# Patient Record
Sex: FEMALE | Race: AMERICAN INDIAN OR ALASKA NATIVE
[De-identification: names, ages, dates, MRNs, and addresses within clinical notes are randomized per-mention and may not be internally consistent; named-entity substitution may affect disease eponyms.]

---

## 2017-12-09 ENCOUNTER — HOSPITAL ENCOUNTER (EMERGENCY)
Dept: HOSPITAL 5 - ED | Age: 27
Discharge: HOME | End: 2017-12-09
Payer: MEDICARE

## 2017-12-09 VITALS — DIASTOLIC BLOOD PRESSURE: 61 MMHG | SYSTOLIC BLOOD PRESSURE: 99 MMHG
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
## 2017-12-09 DIAGNOSIS — S90.111A: Primary | ICD-10-CM

## 2017-12-09 PROCEDURE — 99284 EMERGENCY DEPT VISIT MOD MDM: CPT

## 2017-12-09 NOTE — XRAY REPORT
XRAY RIGHT FOOT THREE VIEWS: 12/09/17 10:00:00





CLINICAL: Trauma with pain and bruise to the right big toe.



FINDINGS: Normal bones and joints.  No fracture or dislocation.  Mild 

soft tissue swelling of the forefoot and at the great toe.  No soft 

tissue air or foreign body.



IMPRESSION: Soft tissue injury.

## 2017-12-09 NOTE — EMERGENCY DEPARTMENT REPORT
ED Lower Extremity HPI





- General


Chief Complaint: Extremity Injury, Lower


Stated Complaint: TOE PAIN


Time Seen by Provider: 12/09/17 11:22


Source: patient


Mode of arrival: Ambulatory


Limitations: No Limitations





- History of Present Illness


Initial Comments: 





Patient reports that she had accident last night where she dropped a plate on 

her right big toe.  She says she is having pain and bruising.  Reports pain is 

8 out of 10 worse with movement better with rest.  Patient she states that she 

tried to go to work today where she is a  and she was unable to perform 

duties because her toe was hurting from standing and walking.  pain is 

throbbing and aching and she did not take any medication for pain.


MD Complaint: foot injury (right big toe pain from injury)


-: Last night


Injury: Toes: Right (great toe pain from injury)


Type of Injury: blunt


Place: home


Severity: severe


Severity scale (0 -10): 8


Improves With: nothing


Worsens With: weight bearing, movement, palpation


Context: direct blow


Associated Symptoms: swelling, ambulatory.  denies: snap/pop sensation, numbness

, tingling, unable to bear weight


Treatments Prior to Arrival: other (none)





- Related Data


 Previous Rx's











 Medication  Instructions  Recorded  Last Taken  Type


 


Acetaminophen/Codeine [Tylenol 1 tab PO Q6H PRN 3 Days #12 tab 12/09/17 Unknown 

Rx





/Codeine # 3 tab]    


 


Ibuprofen [Motrin] 600 mg PO Q8H PRN 5 Days #15 tablet 12/09/17 Unknown Rx











 Allergies











Allergy/AdvReac Type Severity Reaction Status Date / Time


 


No Known Allergies Allergy   Verified 12/13/13 23:58














ED Review of Systems


ROS: 


Stated complaint: TOE PAIN


Other details as noted in HPI





Comment: All other systems reviewed and negative


Constitutional: no symptoms reported


Respiratory: no symptoms reported


Cardiovascular: denies: chest pain, palpitations, dyspnea on exertion, edema, 

syncope, paroxysmal nocturnal dyspnea


Gastrointestinal: denies: abdominal pain, nausea, vomiting


Musculoskeletal: joint swelling, arthralgia.  denies: back pain, myalgia


Skin: change in color (present to right big toe).  denies: rash


Neurological: denies: headache, numbness, paresthesias, confusion, abnormal gait

, vertigo





ED Past Medical Hx





- Past Medical History


Previous Medical History?: No


Additional medical history: abd ulcers





- Surgical History


Past Surgical History?: No





- Family History


Family history: no significant





- Social History


Smoking Status: Never Smoker


Substance Use Type: None





- Medications


Home Medications: 


 Home Medications











 Medication  Instructions  Recorded  Confirmed  Last Taken  Type


 


Acetaminophen/Codeine [Tylenol 1 tab PO Q6H PRN 3 Days #12 tab 12/09/17  

Unknown Rx





/Codeine # 3 tab]     


 


Ibuprofen [Motrin] 600 mg PO Q8H PRN 5 Days #15 tablet 12/09/17  Unknown Rx














ED Physical Exam





- General


Limitations: No Limitations


General appearance: alert, in no apparent distress





- Head


Head exam: Present: atraumatic, normocephalic, normal inspection





- Eye


Eye exam: Present: normal appearance, PERRL, EOMI


Pupils: Present: normal accommodation





- Neck


Neck exam: Present: normal inspection, full ROM.  Absent: tenderness, 

meningismus, lymphadenopathy





- Respiratory


Respiratory exam: Present: normal lung sounds bilaterally.  Absent: respiratory 

distress, chest wall tenderness, accessory muscle use





- Cardiovascular


Cardiovascular Exam: Present: regular rate, normal rhythm, normal heart sounds.

  Absent: systolic murmur, diastolic murmur





- GI/Abdominal


GI/Abdominal exam: Present: soft, normal bowel sounds.  Absent: distended, 

tenderness, guarding, rebound, rigid





- Extremities Exam


Extremities exam: Present: normal inspection, full ROM, tenderness (palpate 

right big toe), joint swelling ( rt big toe), other (no clubbing or cyanosis or 

extremities.  +2 pulses to all extremities.  No swelling to extremities except 

she has swelling to her right great toe with ecchymotic areas and swelling, 

tenderness to palpate right big toe.  She is able to move her right big toe but 

she said it's very painful.).  Absent: calf tenderness





- Expanded Lower Extremity Exam


  ** Right


Hip exam: Present: normal inspection, full ROM, pelvic stability.  Absent: 

tenderness, swelling, abrasion, laceration, ecchymosis, deformity, crepidus, 

dislocation, erythema, external rotation, internal rotation, shortening


Upper Leg exam: Present: normal inspection, full ROM.  Absent: tenderness, 

swelling, abrasion, laceration, ecchymosis, deformity, crepidus, dislocation, 

erythema


Knee exam: Present: normal inspection, full ROM, full knee extension.  Absent: 

tenderness, swelling, abrasion, laceration, ecchymosis, deformity, crepidus, 

dislocation, erythema, effusion, pain w/ pronation/supination, posterior draw 

sign, pain/laxity with valgus, pain/laxity with varus


Lower Leg exam: Present: normal inspection, full ROM.  Absent: tenderness, 

swelling, abrasion, laceration, ecchymosis, deformity, crepidus, dislocation, 

erythema, palpable cord, Mabel's sign


Ankle exam: Present: normal inspection, full ROM.  Absent: tenderness, swelling

, abrasion, laceration, ecchymosis, deformity, crepidus, dislocation, erythema


Foot/Toe exam: Present: full ROM (with full range of motion to extremities.  

Limited range of motion to the right big toe from swelling and injury.  Patient 

with significant pain with range of motion to the right big toe), tenderness (

big toe), swelling, ecchymosis, dislocation.  Absent: normal inspection, 

abrasion, laceration, deformity, crepidus, erythema, amputation, puncture wound

, foreign body, calcaneal tenderness, tenderness at base of 5th metatarsal, 

nail avulsion, subungual hematoma


Neuro vascular tendon exam: Present: no vascular compromise.  Absent: pulse 

deficit, abnormal cap refill, motor deficit, sensory deficit, tendon deficit, 

extremity cold to touch, pallor, abnormal 2-point discrimination, decreased fine

/light touch, foot drop, peroneal nerve deficit, significant pain with passive 

ROM of distal joint


Gait: Positive: observed and limited by pain





- Back Exam


Back exam: Present: normal inspection, full ROM.  Absent: tenderness





- Neurological Exam


Neurological exam: Present: alert, oriented X3, normal gait, reflexes normal.  

Absent: motor sensory deficit





- Psychiatric


Psychiatric exam: Present: normal affect, normal mood





- Skin


Skin exam: Present: warm, dry, intact, ecchymosis (the toe  with tenderness to 

palpate)





ED Course


 Vital Signs











  12/09/17 12/09/17





  10:12 11:34


 


Temperature 98.6 F 


 


Pulse Rate 65 


 


Respiratory 18 20





Rate  


 


Blood Pressure 101/67 


 


O2 Sat by Pulse 99 





Oximetry  














- Reevaluation(s)


Reevaluation #1: 





12/09/17 13:56


Given Motrin 800 mg by mouth for pain to right big toe.  Pain has been 

relieved.  please referred to procedure Notes for splinted in detail 





- Orthopedic Splinting/Casting


  ** Injury #1


Side: right


Lower Extremity Injury Location: toe (right great toe)


Lower Extremity Immobilizer: post-op shoe


Additional Comments: 





Pt with good neurovascular check





ED Lower Extremity MDM





- Radiology Data


Radiology results: report reviewed





X-ray of right foot reveal no fracture or dislocation to right great toe.  

Patient with mild soft tissue swelling.





- Medical Decision Making





ED course:Pt present to emergency room reports that she dropped heavy object on 

her right great toe last night.  She is reporting pain swelling and bruising.  

She is given Motrin 800 mg by mouth in emergency room which relieved her pain.  

X-ray of right foot shows patient with no fracture dislocation but soft tissue 

swelling.  I discussed the patient that she has contusion of her right great 

toe and will need to rest, ice compress and elevate affected area over the next 

72 hours and take Motrin to reduce inflammation.  I discussed with her if she 

has continued pain she is to follow-up with orthopedic doctor.  She was 

understanding discharge diagnosis, treatment plan and follow-up.  Patient 

placed in postop shoe.  Discharged home with prescription for Motrin and 

Tylenol No. 3


Critical care attestation.: 


If time is entered above; I have spent that time in minutes in the direct care 

of this critically ill patient, excluding procedure time.








ED Disposition


Clinical Impression: 


 Contusion of right great toe without damage to nail, initial encounter, Pain 

of right great toe





Disposition: DC-01 TO HOME OR SELFCARE


Is pt being admited?: No


Does the pt Need Aspirin: No


Condition: Stable


Instructions:  Arthralgia (ED), Contusion in Adults (ED), RICE Therapy (ED)


Additional Instructions: 


Follow-up with orthopedic doctor as instructed


Please do not drive or operate heavy machinery while taking Tylenol No. 3 at 

this medication causes drowsiness


Rest, ice, compress and elevate affected area for 3 days.


Prescriptions: 


Acetaminophen/Codeine [Tylenol /Codeine # 3 tab] 1 tab PO Q6H PRN 3 Days #12 tab


 PRN Reason: Pain, Moderate (4-6)


Ibuprofen [Motrin] 600 mg PO Q8H PRN 5 Days #15 tablet


 PRN Reason: Pain


Referrals: 


QUENTIN VARGAS MD [Staff Physician] - 3-5 Days


[facility] [Outside] - 3-5 Days


Forms:  Work/School Release Form(ED)

## 2019-01-03 ENCOUNTER — HOSPITAL ENCOUNTER (EMERGENCY)
Dept: HOSPITAL 5 - ED | Age: 29
Discharge: HOME | End: 2019-01-03
Payer: MEDICARE

## 2019-01-03 VITALS — DIASTOLIC BLOOD PRESSURE: 58 MMHG | SYSTOLIC BLOOD PRESSURE: 108 MMHG

## 2019-01-03 DIAGNOSIS — F17.200: ICD-10-CM

## 2019-01-03 DIAGNOSIS — K29.70: Primary | ICD-10-CM

## 2019-01-03 LAB
BACTERIA #/AREA URNS HPF: (no result) /HPF
BILIRUB UR QL STRIP: (no result)
BLOOD UR QL VISUAL: (no result)
MUCOUS THREADS #/AREA URNS HPF: (no result) /HPF
PH UR STRIP: 5 [PH] (ref 5–7)
PROT UR STRIP-MCNC: (no result) MG/DL
RBC #/AREA URNS HPF: 2 /HPF (ref 0–6)
UROBILINOGEN UR-MCNC: < 2 MG/DL (ref ?–2)
WBC #/AREA URNS HPF: 0 /HPF (ref 0–6)

## 2019-01-03 PROCEDURE — 81025 URINE PREGNANCY TEST: CPT

## 2019-01-03 PROCEDURE — 81001 URINALYSIS AUTO W/SCOPE: CPT

## 2019-01-03 PROCEDURE — 99283 EMERGENCY DEPT VISIT LOW MDM: CPT

## 2019-01-03 NOTE — EMERGENCY DEPARTMENT REPORT
HPI





- General


Chief Complaint: Abdominal Pain


Time Seen by Provider: 01/03/19 16:25





- HPI


HPI: 





This 28-year-old female with a history of gastric ulcers presents to ED 

complaining of epigastric pain 5 days.  Patient states that she took Aleve for 

the pain this morning.  Patient states that she is at work and has been unable 

to go visit evaluated the patient came in today.  Patient denies fevers 

chills/nausea vomiting diarrhea.  Patient states she is food and drink liquids 

just fine.





ED Past Medical Hx





- Past Medical History


Previous Medical History?: Yes


Additional medical history: abd ulcers





- Surgical History


Past Surgical History?: No





- Social History


Smoking Status: Current Every Day Smoker


Substance Use Type: Alcohol





- Medications


Home Medications: 


                                Home Medications











 Medication  Instructions  Recorded  Confirmed  Last Taken  Type


 


Acetaminophen/Codeine [Tylenol 1 tab PO Q6H PRN 3 Days #12 tab 12/09/17  Unknown

 Rx





/Codeine # 3 tab]     


 


Ibuprofen [Motrin] 600 mg PO Q8H PRN 5 Days #15 tablet 12/09/17  Unknown Rx


 


Famotidine [Pepcid] 20 mg PO BID #40 tablet 01/03/19  Unknown Rx














ED Review of Systems


ROS: 


Stated complaint: ULCERS/CHEST/BACK PAIN


Other details as noted in HPI





Comment: All other systems reviewed and negative


Gastrointestinal: abdominal pain (epigastric, nonradiating).  denies: nausea, 

vomiting, diarrhea, constipation


Genitourinary: denies: urgency, dysuria, frequency, hematuria, discharge





Physical Exam





- Physical Exam


Vital Signs: 


                                   Vital Signs











  01/03/19





  13:38


 


Temperature 98.1 F


 


Pulse Rate 60


 


Respiratory 18





Rate 


 


Blood Pressure 108/58


 


O2 Sat by Pulse 100





Oximetry 











Physical Exam: 





GENERAL: Alert and oriented x3, no apparent distress, Normal Gait, atraumatic.


HEAD: Head is normocephalic and a-traumatic.





MOUTH:Mouth is well hydrated and without lesions. Tonsils nonerythematous or 

swollen,  Uvula midline, Tongue not elevated. Mucous membranes are moist. 

Posterior pharynx clear, no exudate or lesions. Patent airways.





LUNGS: Symetrical with respiration, No wheezing, no rales or crackles, CTAB.


HEART:  S1, S2 present, regular rate and rhythm without murmur, no rubs, no 

gallops. Non tender to palpation


ABDOMEN: No organomegaly was noted,Positive bowel sounds, soft, and non-

distended.  . Nontender to palpation on all Quadrants, NO CVA tenderness.


BACK: Full range of motion, no spinal tenderness, nontender to palpation.





SKIN:  Warm and dry, No lesions, No ulceration or induration present.











ED Course


                                   Vital Signs











  01/03/19





  13:38


 


Temperature 98.1 F


 


Pulse Rate 60


 


Respiratory 18





Rate 


 


Blood Pressure 108/58


 


O2 Sat by Pulse 100





Oximetry 














ED Medical Decision Making





- Medical Decision Making





28-year-old female presents with gastritis, complicated


Patient had uneventful ED stay.


No nausea no vomiting and ED


Vital signs are normal patient is in no acute distress


Patient was sent home on   stated


Discussed follow-up with [place] gastroenterologist.  Patient states that she's 

been told she needs to follow-up with gastroenterologist and she will follow up 

this time.                                             





Critical care attestation.: 


If time is entered above; I have spent that time in minutes in the direct care 

of this critically ill patient, excluding procedure time.








ED Disposition


Clinical Impression: 


 Gastritis





Disposition: DC-01 TO HOME OR SELFCARE


Is pt being admited?: No


Does the pt Need Aspirin: No


Condition: Stable


Instructions:  Gastritis (ED), Diet for Ulcers and Gastritis (ED), Abdominal 

Pain (ED)


Additional Instructions: 


Make sure to follow up with the gastroenterologist as you have been referred 


Take all your medications as you've been prescribed.


If you have any worsening symptoms or develop new symptoms please return to ED 

immediately.


Prescriptions: 


Famotidine [Pepcid] 20 mg PO BID #40 tablet


Referrals: 


PRIMARY CARE,MD [Primary Care Provider] - 3-5 Days


[facility] [Provider Group] - 3-5 Days


[facility] [Provider Group] - 3-5 Days


Forms:  Work/School Release Form(ED)


Time of Disposition: 16:59

## 2019-01-03 NOTE — EMERGENCY DEPARTMENT REPORT
HPI





- General


Chief Complaint: Abdominal Pain


Time Seen by Provider: 01/03/19 16:25





- HPI


HPI: 





Room 31





The patient is a 31-year-old female presenting with a chief complaint of cold.





ED Past Medical Hx





- Past Medical History


Previous Medical History?: Yes


Additional medical history: abd ulcers





- Surgical History


Past Surgical History?: No





- Social History


Smoking Status: Current Every Day Smoker


Substance Use Type: Alcohol





- Medications


Home Medications: 


                                Home Medications











 Medication  Instructions  Recorded  Confirmed  Last Taken  Type


 


Acetaminophen/Codeine [Tylenol 1 tab PO Q6H PRN 3 Days #12 tab 12/09/17  Unknown

 Rx





/Codeine # 3 tab]     


 


Ibuprofen [Motrin] 600 mg PO Q8H PRN 5 Days #15 tablet 12/09/17  Unknown Rx














ED Review of Systems


ROS: 


Stated complaint: ULCERS/CHEST/BACK PAIN


Other details as noted in HPI








Physical Exam





- Physical Exam


Vital Signs: 


                                   Vital Signs











  01/03/19





  13:38


 


Temperature 98.1 F


 


Pulse Rate 60


 


Respiratory 18





Rate 


 


Blood Pressure 108/58


 


O2 Sat by Pulse 100





Oximetry 














ED Course


                                   Vital Signs











  01/03/19





  13:38


 


Temperature 98.1 F


 


Pulse Rate 60


 


Respiratory 18





Rate 


 


Blood Pressure 108/58


 


O2 Sat by Pulse 100





Oximetry 











Critical care attestation.: 


If time is entered above; I have spent that time in minutes in the direct care 

of this critically ill patient, excluding procedure time.








ED Disposition


Condition: Stable


Instructions:  Abdominal Pain (ED)


Referrals: 


PRIMARY CARE,MD [Primary Care Provider] - 3-5 Days